# Patient Record
Sex: FEMALE | Race: WHITE | Employment: FULL TIME | URBAN - NONMETROPOLITAN AREA
[De-identification: names, ages, dates, MRNs, and addresses within clinical notes are randomized per-mention and may not be internally consistent; named-entity substitution may affect disease eponyms.]

---

## 2017-09-18 ENCOUNTER — OFFICE VISIT (OUTPATIENT)
Dept: PSYCHIATRY | Age: 27
End: 2017-09-18
Payer: COMMERCIAL

## 2017-09-18 DIAGNOSIS — F40.01 PANIC DISORDER WITH AGORAPHOBIA: ICD-10-CM

## 2017-09-18 DIAGNOSIS — F31.31 BIPOLAR AFFECTIVE DISORDER, CURRENTLY DEPRESSED, MILD (HCC): ICD-10-CM

## 2017-09-18 DIAGNOSIS — F44.5: ICD-10-CM

## 2017-09-18 PROCEDURE — 90792 PSYCH DIAG EVAL W/MED SRVCS: CPT | Performed by: PSYCHIATRY & NEUROLOGY

## 2017-09-18 RX ORDER — CLONAZEPAM 0.5 MG/1
0.5 TABLET ORAL 2 TIMES DAILY
Qty: 60 TABLET | Refills: 2 | Status: SHIPPED | OUTPATIENT
Start: 2017-09-18 | End: 2018-01-04 | Stop reason: SDUPTHER

## 2017-09-18 RX ORDER — CLONAZEPAM 0.5 MG/1
0.5 TABLET ORAL 2 TIMES DAILY PRN
Qty: 60 TABLET | Refills: 2 | Status: CANCELLED | OUTPATIENT
Start: 2017-09-18

## 2017-10-16 ENCOUNTER — OFFICE VISIT (OUTPATIENT)
Dept: PSYCHIATRY | Age: 27
End: 2017-10-16
Payer: COMMERCIAL

## 2017-10-16 DIAGNOSIS — F31.31 BIPOLAR AFFECTIVE DISORDER, CURRENTLY DEPRESSED, MILD (HCC): Primary | ICD-10-CM

## 2017-10-16 DIAGNOSIS — F40.01 PANIC DISORDER WITH AGORAPHOBIA: ICD-10-CM

## 2017-10-16 DIAGNOSIS — F44.5: ICD-10-CM

## 2017-10-16 PROCEDURE — 99213 OFFICE O/P EST LOW 20 MIN: CPT | Performed by: PSYCHIATRY & NEUROLOGY

## 2017-10-16 NOTE — PROGRESS NOTES
Chief Complaint   Patient presents with    1 Month Follow-Up     Bipolar depression Panic Conversion     BODØ returned after one month to follow-up on bipolar disorder panic disorder and conversion disorder. I showed her the genetic testing report. She looked through it and had some questions which I attempted to answer. Unfortunately the test report did not include folate metabolism. This apparently was omitted originally in the orders. Cedric Benjamin is going to try to ask the company to run it from the available sample. She is currently on Latuda 40. However she's only been at that level for two or three days. Therefore it's a little premature to know whether it's going to be helpful or not. She thinks it's beginning to help her mood. I suggested that we simply wait longer and in a couple of weeks see if we need to go to a higher dose or not. She is agreeable to that. I will plan to see her for four weeks but she can call me in two if she wants to increase it. We agreed that we would go to 60 mg in that event. She is sleeping better she reports.     Mental Status Examination    Level of consciousness:  within normal limits  Appearance:  well-appearing, street clothes, in chair, good grooming and good hygiene  Behavior/Motor:  no abnormalities noted  Attitude toward examiner:  cooperative, attentive and good eye contact  Speech:  spontaneous, normal rate, normal volume and well articulated  Mood:  mild depression  Affect:  mood congruent  Thought processes:  linear, goal directed and coherent  Thought content:  Homocidal ideation: none  Suicidal Ideation:  denies suicidal ideation  Delusions:  no evidence of delusions  Perceptual Disturbance:  denies any perceptual disturbance  Cognition:  oriented to person, place, and time  Concentration succeeded  Memory intact  Fund of knowledge:  good  Abstract thinking:  good  Insight:  good  Judgment:  good    DIAGNOSTIC IMPRESSION  Bipolar depressed  Panic

## 2017-10-25 ENCOUNTER — TELEPHONE (OUTPATIENT)
Dept: PSYCHIATRY | Age: 27
End: 2017-10-25

## 2017-10-26 ENCOUNTER — TELEPHONE (OUTPATIENT)
Dept: PSYCHIATRY | Age: 27
End: 2017-10-26

## 2017-10-26 NOTE — TELEPHONE ENCOUNTER
----- Message from Sanjeev Adame MD sent at 10/25/2017  5:15 PM EDT -----  She can have 60 mg/day with food

## 2017-10-31 ENCOUNTER — TELEPHONE (OUTPATIENT)
Dept: PSYCHIATRY | Age: 27
End: 2017-10-31

## 2017-11-02 ENCOUNTER — TELEPHONE (OUTPATIENT)
Dept: PSYCHIATRY | Age: 27
End: 2017-11-02

## 2017-11-06 RX ORDER — ARIPIPRAZOLE 10 MG/1
10 TABLET ORAL DAILY
Qty: 30 TABLET | Refills: 0 | Status: SHIPPED | OUTPATIENT
Start: 2017-11-06 | End: 2017-12-04 | Stop reason: SDUPTHER

## 2017-11-06 NOTE — TELEPHONE ENCOUNTER
Clydia Paterson called requesting a refill of Abilify.  Patient discontinuing Freida Cesar      Last Appointment Date: 10/16/2017  Next Appointment Date: 11/10/2017

## 2017-11-10 ENCOUNTER — OFFICE VISIT (OUTPATIENT)
Dept: PSYCHIATRY | Age: 27
End: 2017-11-10
Payer: COMMERCIAL

## 2017-11-10 DIAGNOSIS — F31.31 BIPOLAR AFFECTIVE DISORDER, CURRENTLY DEPRESSED, MILD (HCC): Primary | ICD-10-CM

## 2017-11-10 DIAGNOSIS — F40.01 PANIC DISORDER WITH AGORAPHOBIA: ICD-10-CM

## 2017-11-10 DIAGNOSIS — F44.5: ICD-10-CM

## 2017-11-10 PROCEDURE — 99213 OFFICE O/P EST LOW 20 MIN: CPT | Performed by: PSYCHIATRY & NEUROLOGY

## 2017-11-10 RX ORDER — CITALOPRAM 10 MG/1
10 TABLET ORAL DAILY
Qty: 30 TABLET | Refills: 3 | Status: SHIPPED | OUTPATIENT
Start: 2017-11-10 | End: 2018-01-04

## 2017-11-10 NOTE — PROGRESS NOTES
Chief Complaint   Patient presents with    1 Month Follow-Up     Bipolar dep, Panic Conversion disorder     Kameron Rodríguez returned after one month to follow-up on bipolar disorder, panic, and pseudoseizures. She is now on Abilify 10 mg daily, but just started. Koki Greg caused her to have increased suicidal ideation, and Lamictal caused a rash. She is having panic and anxiety symptoms, so I'm going to give her some Celexa 10 milligrams, hoping to control the panic and the depression. That dose may need to be adjusted. She has seen Dr. Roselyn Yee on at least one occasion. She felt it went well. I think that he has a different diagnostic understanding. I did check the genetic testing results and the Celexa should be a good drug for her. No other changes were made.     Mental Status Examination    Level of consciousness:  within normal limits  Appearance:  well-appearing, street clothes, in chair, good grooming and good hygiene  Behavior/Motor:  no abnormalities noted  Attitude toward examiner:  cooperative, attentive and good eye contact  Speech:  spontaneous, normal rate, normal volume and well articulated  Mood:  depressed, anxious  Affect:  mood congruent  Thought processes:  linear, goal directed and coherent  Thought content:  Homocidal ideation: none  Suicidal Ideation:  denies suicidal ideation at this time  Delusions:  no evidence of delusions  Perceptual Disturbance:  denies any perceptual disturbance  Cognition:  oriented to person, place, and time  Concentration Not tested no apparent problem on casual observation  Memory Not tested no apparent problem on casual observation  Fund of knowledge:  good  Abstract thinking:  fair  Insight:  good  Judgment:  good    DIAGNOSTIC IMPRESSION  Bipolar I depressed  Panic with agoraphobia  Conversion disorder    Plan  Add citalopram  Continue Abilify, clonazepam  Current Outpatient Prescriptions   Medication Sig Dispense Refill    citalopram (CELEXA) 10 MG tablet

## 2017-12-04 RX ORDER — ARIPIPRAZOLE 10 MG/1
10 TABLET ORAL DAILY
Qty: 30 TABLET | Refills: 1 | Status: SHIPPED | OUTPATIENT
Start: 2017-12-04 | End: 2018-02-22 | Stop reason: SDUPTHER

## 2017-12-05 ENCOUNTER — TELEPHONE (OUTPATIENT)
Dept: PSYCHIATRY | Age: 27
End: 2017-12-05

## 2018-01-04 ENCOUNTER — OFFICE VISIT (OUTPATIENT)
Dept: PSYCHIATRY | Age: 28
End: 2018-01-04
Payer: COMMERCIAL

## 2018-01-04 DIAGNOSIS — F44.5: ICD-10-CM

## 2018-01-04 DIAGNOSIS — F31.31 BIPOLAR AFFECTIVE DISORDER, CURRENTLY DEPRESSED, MILD (HCC): Primary | ICD-10-CM

## 2018-01-04 DIAGNOSIS — F40.01 PANIC DISORDER WITH AGORAPHOBIA: ICD-10-CM

## 2018-01-04 PROCEDURE — 99213 OFFICE O/P EST LOW 20 MIN: CPT | Performed by: PSYCHIATRY & NEUROLOGY

## 2018-01-04 RX ORDER — DESVENLAFAXINE 50 MG/1
50 TABLET, EXTENDED RELEASE ORAL DAILY
Qty: 30 TABLET | Refills: 3 | Status: SHIPPED | OUTPATIENT
Start: 2018-01-04 | End: 2018-02-14

## 2018-01-04 RX ORDER — CLONAZEPAM 0.5 MG/1
0.5 TABLET ORAL 2 TIMES DAILY
Qty: 60 TABLET | Refills: 2 | Status: SHIPPED | OUTPATIENT
Start: 2018-01-04 | End: 2018-04-18 | Stop reason: SDUPTHER

## 2018-01-04 NOTE — PROGRESS NOTES
Chief Complaint   Patient presents with    Follow-up     2 mos Bipolar Panic Conversion     Donaldson Hose returned after 60 days for follow-up of bipolar disorder, panic, and conversion disorder. She notes that her seizures are increasing in frequency, and she may have several a week now. She is considering filing for disability. I don't know whether she can be granted disability with a diagnosis like that, but I didn't discourage her from trying. She says she's been fighting it for over a year. Meanwhile she stopped the Celexa. She felt it was making her more tearful, and also more tired. She was so tired she felt she couldn't work. That leaves her on Abilify and Klonopin. She did need a refill on the Klonopin. That was provided. I will start her on Pristiq as a replacement for the citalopram.     She does have reduced folate conversion, but is already taking a prenatal vitamin with high folate.     Mental Status Examination    Level of consciousness:  within normal limits  Appearance:  well-appearing, street clothes, in chair, good grooming and good hygiene  Behavior/Motor:  no abnormalities noted  Attitude toward examiner:  cooperative, attentive and good eye contact  Speech:  spontaneous, normal rate, normal volume and well articulated  Mood:  depressed  Affect:  mood congruent  Thought processes:  linear, goal directed and coherent  Thought content:  Homocidal ideation: none  Suicidal Ideation:  denies suicidal ideation  Delusions:  no evidence of delusions  Perceptual Disturbance:  none  Cognition:  oriented to person, place, and time  Concentration succeeded  Memory intact  Fund of knowledge:  good  Abstract thinking:  fair  Insight:  good  Judgment:  good    DIAGNOSTIC IMPRESSION  Bipolar dep, mild  Panic with agoraphobia  Conversion with pseudoseizures    Plan  Stop citalopram  Trial Pristiq  Current Outpatient Prescriptions   Medication Sig Dispense Refill    clonazePAM (KLONOPIN) 0.5 MG

## 2018-02-14 ENCOUNTER — OFFICE VISIT (OUTPATIENT)
Dept: PSYCHIATRY | Age: 28
End: 2018-02-14
Payer: COMMERCIAL

## 2018-02-14 DIAGNOSIS — F44.5: ICD-10-CM

## 2018-02-14 DIAGNOSIS — F40.01 PANIC DISORDER WITH AGORAPHOBIA: ICD-10-CM

## 2018-02-14 DIAGNOSIS — F31.31 BIPOLAR AFFECTIVE DISORDER, CURRENTLY DEPRESSED, MILD (HCC): Primary | ICD-10-CM

## 2018-02-14 PROCEDURE — 99212 OFFICE O/P EST SF 10 MIN: CPT | Performed by: PSYCHIATRY & NEUROLOGY

## 2018-02-14 RX ORDER — DESVENLAFAXINE 100 MG/1
100 TABLET, EXTENDED RELEASE ORAL DAILY
Qty: 30 TABLET | Refills: 3 | Status: SHIPPED | OUTPATIENT
Start: 2018-02-14 | End: 2018-06-22 | Stop reason: SDUPTHER

## 2018-02-14 NOTE — PROGRESS NOTES
daily 30 tablet 3    clonazePAM (KLONOPIN) 0.5 MG tablet Take 1 tablet by mouth 2 times daily for 30 days. 60 tablet 2    ARIPiprazole (ABILIFY) 10 MG tablet Take 1 tablet by mouth daily 30 tablet 1     No current facility-administered medications for this visit.

## 2018-02-19 ENCOUNTER — TELEPHONE (OUTPATIENT)
Dept: PSYCHIATRY | Age: 28
End: 2018-02-19

## 2018-02-21 ENCOUNTER — TELEPHONE (OUTPATIENT)
Dept: PSYCHIATRY | Age: 28
End: 2018-02-21

## 2018-02-23 RX ORDER — ARIPIPRAZOLE 10 MG/1
TABLET ORAL
Qty: 30 TABLET | Refills: 1 | Status: SHIPPED | OUTPATIENT
Start: 2018-02-23 | End: 2018-04-18

## 2018-02-23 NOTE — TELEPHONE ENCOUNTER
[address] [place] [address] is requesting a refill of Abilify on Ingrid's behalf. She attended an appointment in the office 2/14 and is scheduled to return 3/28.

## 2018-04-18 ENCOUNTER — OFFICE VISIT (OUTPATIENT)
Dept: PSYCHIATRY | Age: 28
End: 2018-04-18
Payer: COMMERCIAL

## 2018-04-18 DIAGNOSIS — F31.31 BIPOLAR AFFECTIVE DISORDER, CURRENTLY DEPRESSED, MILD (HCC): Primary | ICD-10-CM

## 2018-04-18 DIAGNOSIS — F44.5: ICD-10-CM

## 2018-04-18 DIAGNOSIS — F40.01 PANIC DISORDER WITH AGORAPHOBIA: ICD-10-CM

## 2018-04-18 PROCEDURE — 99212 OFFICE O/P EST SF 10 MIN: CPT | Performed by: PSYCHIATRY & NEUROLOGY

## 2018-04-18 RX ORDER — CLONAZEPAM 0.5 MG/1
0.5 TABLET ORAL 2 TIMES DAILY
Qty: 60 TABLET | Refills: 2 | Status: SHIPPED | OUTPATIENT
Start: 2018-04-18 | End: 2018-08-14

## 2018-04-18 RX ORDER — ARIPIPRAZOLE 5 MG/1
5 TABLET ORAL DAILY
Qty: 30 TABLET | Refills: 3 | Status: SHIPPED | OUTPATIENT
Start: 2018-04-18 | End: 2018-06-11 | Stop reason: SDUPTHER

## 2018-05-22 ENCOUNTER — TELEPHONE (OUTPATIENT)
Dept: PSYCHIATRY | Age: 28
End: 2018-05-22

## 2018-06-11 RX ORDER — ARIPIPRAZOLE 5 MG/1
5 TABLET ORAL 2 TIMES DAILY
Qty: 60 TABLET | Refills: 0 | Status: SHIPPED | OUTPATIENT
Start: 2018-06-11 | End: 2018-09-05

## 2018-06-22 RX ORDER — DESVENLAFAXINE 100 MG/1
TABLET, EXTENDED RELEASE ORAL
Qty: 30 TABLET | Refills: 3 | Status: SHIPPED | OUTPATIENT
Start: 2018-06-22 | End: 2018-08-14

## 2018-07-30 ENCOUNTER — TELEPHONE (OUTPATIENT)
Dept: PSYCHIATRY | Age: 28
End: 2018-07-30

## 2018-08-14 ENCOUNTER — OFFICE VISIT (OUTPATIENT)
Dept: PSYCHIATRY | Age: 28
End: 2018-08-14
Payer: COMMERCIAL

## 2018-08-14 DIAGNOSIS — F31.31 BIPOLAR AFFECTIVE DISORDER, CURRENTLY DEPRESSED, MILD (HCC): Primary | ICD-10-CM

## 2018-08-14 DIAGNOSIS — F44.5: ICD-10-CM

## 2018-08-14 DIAGNOSIS — F40.01 PANIC DISORDER WITH AGORAPHOBIA: ICD-10-CM

## 2018-08-14 PROCEDURE — 99213 OFFICE O/P EST LOW 20 MIN: CPT | Performed by: PSYCHIATRY & NEUROLOGY

## 2018-08-14 RX ORDER — TRAZODONE HYDROCHLORIDE 100 MG/1
100 TABLET ORAL NIGHTLY
Qty: 30 TABLET | Refills: 2 | Status: SHIPPED | OUTPATIENT
Start: 2018-08-14 | End: 2019-02-06

## 2018-08-14 NOTE — PROGRESS NOTES
toward examiner:  cooperative, attentive and good eye contact  Speech:  spontaneous, normal rate, normal volume and well articulated  Mood:  mild depression  Affect:  mood congruent  Thought processes:  linear, goal directed and coherent  Thought content:  Homocidal ideation: none  Suicidal Ideation:  denies suicidal ideation  Delusions:  no evidence of delusions  Perceptual Disturbance:  denies any perceptual disturbance  Cognition:  oriented to person, place, and time  Concentration succeeded  Memory intact  Fund of knowledge:  good  Abstract thinking:  good  Insight:  good  Judgment:  questionable    DIAGNOSTIC IMPRESSION  Bipolar Mixed, currently depressed  Panic with agoraphobia  Conversion disorder    Plan  Increase trazodone  Maintain sertraline longer  Stop clonazepam  Current Outpatient Prescriptions   Medication Sig Dispense Refill    traZODone (DESYREL) 100 MG tablet Take 1 tablet by mouth nightly 30 tablet 2    ARIPiprazole (ABILIFY) 5 MG tablet Take 1 tablet by mouth 2 times daily 60 tablet 0     No current facility-administered medications for this visit.

## 2018-09-04 ENCOUNTER — TELEPHONE (OUTPATIENT)
Dept: PSYCHIATRY | Age: 28
End: 2018-09-04

## 2018-09-04 NOTE — TELEPHONE ENCOUNTER
Shahzad Coon called the office to see if her medications could possibly be adjusted. She has had two pseudo seizures in two weeks and her employer is getting frustrated with her. She attended an appointment in the office 8/14 and is scheduled to return 9/11.

## 2018-09-05 ENCOUNTER — OFFICE VISIT (OUTPATIENT)
Dept: PSYCHIATRY | Age: 28
End: 2018-09-05
Payer: COMMERCIAL

## 2018-09-05 DIAGNOSIS — F31.31 BIPOLAR AFFECTIVE DISORDER, CURRENTLY DEPRESSED, MILD (HCC): Primary | ICD-10-CM

## 2018-09-05 DIAGNOSIS — F40.01 PANIC DISORDER WITH AGORAPHOBIA: ICD-10-CM

## 2018-09-05 DIAGNOSIS — F44.5: ICD-10-CM

## 2018-09-05 PROCEDURE — 99213 OFFICE O/P EST LOW 20 MIN: CPT | Performed by: PSYCHIATRY & NEUROLOGY

## 2018-09-05 RX ORDER — SERTRALINE HYDROCHLORIDE 100 MG/1
100 TABLET, FILM COATED ORAL DAILY
Qty: 30 TABLET | Refills: 3 | Status: SHIPPED | OUTPATIENT
Start: 2018-09-05 | End: 2019-02-06 | Stop reason: SDUPTHER

## 2018-09-05 NOTE — PROGRESS NOTES
Chief Complaint   Patient presents with    Follow-up     3 wks Bipolar dep Panic with Conversion     Vonnie Plascencia returned at a little over three weeks because she had seizures at work and was sent home. She just started work at the end of August.  After the third day she had one of what is now three seizures. 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 I spoke with the  and the  by phone. Because she is such a new employee she doesn't have anything in the way of benefits. The  indicated that he would get with the HR person to see if there is something that they can do to protect Ingrid's job. I told him I would like to take her off work for two weeks for some medication changes. He wanted me to predict her future course, and I told him I cannot do that very well. She has pseudoseizures so these are emotionally based. She is stressed by having the new job. She also told me that she has gained weight on the Abilify. She thinks it's been helpful but she's put on about 20 pounds so far, and she is only been on it for four months. To try switching to a Rexulti which has less she hated weight gain and we'll see if she does any better at the first challenge week to see if makes it affordable for her. I will inform Vonnie Plascencia of my discussion with the . I sent in a form to take her off work for two weeks. I tried very hard not to reveal any information to him beyond what he already knows.     Mental Status Examination    Level of consciousness:  within normal limits  Appearance:  well-appearing, street clothes, in chair, good grooming and good hygiene  Behavior/Motor:  no abnormalities noted  Attitude toward examiner:  cooperative, attentive and good eye contact  Speech:  spontaneous, normal rate, normal volume and well articulated  Mood:  anxious  Affect:  mood congruent  Thought processes: linear, goal directed and coherent  Thought content:  Homocidal ideation: none  Suicidal Ideation:  denies suicidal ideation  Delusions:  no evidence of delusions  Perceptual Disturbance:  denies any perceptual disturbance  Cognition:  oriented to person, place, and time  Concentration succeeded  Memory intact  Fund of knowledge:  good  Abstract thinking:  good  Insight:  good  Judgment:  good    DIAGNOSTIC IMPRESSION  Bipllar dep, mild  Panic with agoraphobia  Conversion disorder (pseudoseizures)    Plan  Work release for two weeks  Start Rexulti, stop Abilify  Increase sertraline  Increase trazodone  Consider restart of Klonopin  Current Outpatient Prescriptions   Medication Sig Dispense Refill    brexpiprazole (REXULTI) 0.5 MG TABS tablet Take 1 tablet by mouth daily Increase as directed on a weekly basis 30 tablet 1    sertraline (ZOLOFT) 100 MG tablet Take 1 tablet by mouth daily 30 tablet 3    traZODone (DESYREL) 100 MG tablet Take 1 tablet by mouth nightly 30 tablet 2     No current facility-administered medications for this visit.

## 2018-09-19 ENCOUNTER — OFFICE VISIT (OUTPATIENT)
Dept: PSYCHIATRY | Age: 28
End: 2018-09-19
Payer: COMMERCIAL

## 2018-09-19 DIAGNOSIS — F31.31 BIPOLAR AFFECTIVE DISORDER, CURRENTLY DEPRESSED, MILD (HCC): Primary | ICD-10-CM

## 2018-09-19 DIAGNOSIS — F40.01 PANIC DISORDER WITH AGORAPHOBIA: ICD-10-CM

## 2018-09-19 DIAGNOSIS — F44.5 CONVERSION DISORDER WITH ATTACKS OR SEIZURES: ICD-10-CM

## 2018-09-19 PROCEDURE — G8421 BMI NOT CALCULATED: HCPCS | Performed by: PSYCHIATRY & NEUROLOGY

## 2018-09-19 PROCEDURE — 4004F PT TOBACCO SCREEN RCVD TLK: CPT | Performed by: PSYCHIATRY & NEUROLOGY

## 2018-09-19 PROCEDURE — 99213 OFFICE O/P EST LOW 20 MIN: CPT | Performed by: PSYCHIATRY & NEUROLOGY

## 2018-09-19 PROCEDURE — G8428 CUR MEDS NOT DOCUMENT: HCPCS | Performed by: PSYCHIATRY & NEUROLOGY

## 2018-09-19 NOTE — PROGRESS NOTES
Chief Complaint   Patient presents with    2 Week Follow-Up     Biplar dep Panic with Conversion disorder     Radha Lau returned after approximately two weeks to follow-up on conversion disorder, bipolar depression, and panic with agoraphobia. We had augmented her regimen last time with Rexulti, increased her antidepressant, and increased her sleeper. All three maneuvers appear to have been beneficial,  although when they're done simultaneously it makes it harder to decide if one is more or less responsible than the others. She is not reporting any changes in side effects, and she's had no further seizures. She tells me her anxiety is much improved, her mood is better, and she feels ready to return to work. I see no obvious risks or safety concerns, so I am going to release her back to work as of today. I believe she is scheduled to work another shift tonight. Some of the stressors on her have also abated. Her nephew is no longer stealing and she thinks his mother is doing more to correct his behavior. Her weight gain has stopped, although she has not really lost any. She is now sleeping. I made no changes today. We will continue with the current regimen. I've asked her to return in 30 days.     Mental Status Examination    Level of consciousness:  within normal limits  Appearance:  well-appearing, street clothes, in chair, good grooming and good hygiene  Behavior/Motor:  no abnormalities noted  Attitude toward examiner:  cooperative, attentive and good eye contact  Speech:  spontaneous, normal rate, normal volume and well articulated  Mood:  euthymic  Affect:  mood congruent  Thought processes:  linear, goal directed and coherent  Thought content:  Homocidal ideation: none  Suicidal Ideation:  denies suicidal ideation  Delusions:  no evidence of delusions  Perceptual Disturbance:  denies any perceptual disturbance  Cognition:  oriented to person, place, and time  Concentration succeeded  Memory intact  Fund of knowledge:  good  Abstract thinking:  good  Insight:  good  Judgment:  good    DIAGNOSTIC IMPRESSION  Bipolar disorder depressed  Panic with agoraphobia  Conversion disorder with seizures    Plan  May return to work as of today  Current Outpatient Prescriptions   Medication Sig Dispense Refill    brexpiprazole (REXULTI) 0.5 MG TABS tablet Take 1 tablet by mouth daily Increase as directed on a weekly basis 30 tablet 1    sertraline (ZOLOFT) 100 MG tablet Take 1 tablet by mouth daily 30 tablet 3    traZODone (DESYREL) 100 MG tablet Take 1 tablet by mouth nightly 30 tablet 2     No current facility-administered medications for this visit.

## 2018-10-23 ENCOUNTER — OFFICE VISIT (OUTPATIENT)
Dept: PSYCHIATRY | Age: 28
End: 2018-10-23
Payer: COMMERCIAL

## 2018-10-23 DIAGNOSIS — F44.5 CONVERSION DISORDER WITH ATTACKS OR SEIZURES: ICD-10-CM

## 2018-10-23 DIAGNOSIS — F31.76 BIPOLAR DISORDER, IN FULL REMISSION, MOST RECENT EPISODE DEPRESSED (HCC): Primary | ICD-10-CM

## 2018-10-23 DIAGNOSIS — F40.01 PANIC DISORDER WITH AGORAPHOBIA: ICD-10-CM

## 2018-10-23 PROCEDURE — G8421 BMI NOT CALCULATED: HCPCS | Performed by: PSYCHIATRY & NEUROLOGY

## 2018-10-23 PROCEDURE — 4004F PT TOBACCO SCREEN RCVD TLK: CPT | Performed by: PSYCHIATRY & NEUROLOGY

## 2018-10-23 PROCEDURE — 99212 OFFICE O/P EST SF 10 MIN: CPT | Performed by: PSYCHIATRY & NEUROLOGY

## 2018-10-23 PROCEDURE — G8484 FLU IMMUNIZE NO ADMIN: HCPCS | Performed by: PSYCHIATRY & NEUROLOGY

## 2018-10-23 PROCEDURE — G8428 CUR MEDS NOT DOCUMENT: HCPCS | Performed by: PSYCHIATRY & NEUROLOGY

## 2018-10-23 NOTE — PROGRESS NOTES
Chief Complaint   Patient presents with    1 Month Follow-Up     Bipolar  Panic Conversion     Patito Salgado returned after one month to follow-up on bipolar disorder, panic, and conversion. The current regimen is sertraline 100 mg daily, brexpiperazole 0.5 mg daily, and trazodone when necessary. She reports that she is stable, and not symptomatic. She's not having any side effect issues. She feels everything is working well including her folate supplement and the trazodone when she uses it. All of her symptomatic areas are improved. Her moods are stable, she is not having panic attacks, and has not had any pseudoseizures either. We will make no changes.     Mental Status Examination    Level of consciousness:  within normal limits  Appearance:  well-appearing, street clothes, in chair, good grooming and good hygiene  Behavior/Motor:  no abnormalities noted  Attitude toward examiner:  cooperative, attentive and good eye contact  Speech:  spontaneous, normal rate, normal volume and well articulated  Mood:  euthymic  Affect:  mood congruent  Thought processes:  linear, goal directed and coherent  Thought content:  Homocidal ideation: none  Suicidal Ideation:  denies suicidal ideation  Delusions:  no evidence of delusions  Perceptual Disturbance:  denies any perceptual disturbance  Cognition:  oriented to person, place, and time  Concentration succeeded  Memory intact  Fund of knowledge:  good  Abstract thinking:  good  Insight:  good  Judgment:  good    DIAGNOSTIC IMPRESSION  Bipolar dep full remission  Panic with agoraphobia  Conversion (sz)    Plan  No changes  Current Outpatient Prescriptions   Medication Sig Dispense Refill    brexpiprazole (REXULTI) 0.5 MG TABS tablet Take 1 tablet by mouth daily Increase as directed on a weekly basis 30 tablet 1    sertraline (ZOLOFT) 100 MG tablet Take 1 tablet by mouth daily 30 tablet 3    traZODone (DESYREL) 100 MG tablet Take 1 tablet by mouth nightly 30 tablet 2 No current facility-administered medications for this visit.

## 2019-02-06 ENCOUNTER — OFFICE VISIT (OUTPATIENT)
Dept: PSYCHIATRY | Age: 29
End: 2019-02-06
Payer: COMMERCIAL

## 2019-02-06 DIAGNOSIS — F31.31 BIPOLAR AFFECTIVE DISORDER, CURRENTLY DEPRESSED, MILD (HCC): Primary | ICD-10-CM

## 2019-02-06 DIAGNOSIS — F40.01 PANIC DISORDER WITH AGORAPHOBIA: ICD-10-CM

## 2019-02-06 DIAGNOSIS — F44.5 CONVERSION DISORDER WITH ATTACKS OR SEIZURES: ICD-10-CM

## 2019-02-06 PROCEDURE — 99213 OFFICE O/P EST LOW 20 MIN: CPT | Performed by: PSYCHIATRY & NEUROLOGY

## 2019-02-06 RX ORDER — ZOLPIDEM TARTRATE 5 MG/1
5 TABLET ORAL NIGHTLY PRN
Qty: 30 TABLET | Refills: 2 | Status: SHIPPED | OUTPATIENT
Start: 2019-02-06 | End: 2019-05-07

## 2019-02-06 RX ORDER — SERTRALINE HYDROCHLORIDE 100 MG/1
100 TABLET, FILM COATED ORAL DAILY
Qty: 30 TABLET | Refills: 5 | Status: SHIPPED | OUTPATIENT
Start: 2019-02-06

## 2019-02-08 ENCOUNTER — TELEPHONE (OUTPATIENT)
Dept: PSYCHIATRY | Age: 29
End: 2019-02-08

## 2019-06-13 ENCOUNTER — HOSPITAL ENCOUNTER (OUTPATIENT)
Age: 29
Setting detail: SPECIMEN
Discharge: HOME | End: 2019-06-13
Payer: COMMERCIAL

## 2019-06-13 LAB
DIRECT EXAM: ABNORMAL
HCG QUALITATIVE: NEGATIVE
Lab: ABNORMAL
SPECIMEN DESCRIPTION: ABNORMAL
TSH SERPL DL<=0.05 MIU/L-ACNC: 1.3 MIU/L (ref 0.3–5)

## 2019-06-17 LAB
CHLAMYDIA BY THIN PREP: NEGATIVE
CYTOLOGY REPORT: NORMAL
N. GONORRHOEAE DNA, THIN PREP: NEGATIVE
SPECIMEN DESCRIPTION: NORMAL

## 2019-10-22 ENCOUNTER — HOSPITAL ENCOUNTER (OUTPATIENT)
Age: 29
Setting detail: SPECIMEN
Discharge: HOME | End: 2019-10-22
Payer: COMMERCIAL

## 2019-10-22 LAB
FOLLICLE STIMULATING HORMONE: 5.3 U/L (ref 1.7–21.5)
LH: 7.2 U/L (ref 1–95.6)

## 2020-09-18 ENCOUNTER — HOSPITAL ENCOUNTER (OUTPATIENT)
Age: 30
Setting detail: SPECIMEN
Discharge: HOME | End: 2020-09-18
Payer: COMMERCIAL

## 2020-09-21 LAB
C TRACH DNA GENITAL QL NAA+PROBE: NEGATIVE
N. GONORRHOEAE DNA: NEGATIVE
SPECIMEN DESCRIPTION: NORMAL